# Patient Record
Sex: FEMALE | NOT HISPANIC OR LATINO | URBAN - METROPOLITAN AREA
[De-identification: names, ages, dates, MRNs, and addresses within clinical notes are randomized per-mention and may not be internally consistent; named-entity substitution may affect disease eponyms.]

---

## 2018-08-31 ENCOUNTER — IMPORTED ENCOUNTER (OUTPATIENT)
Dept: URBAN - METROPOLITAN AREA CLINIC 1 | Facility: CLINIC | Age: 68
End: 2018-08-31

## 2018-08-31 PROBLEM — H40.1122: Noted: 2018-08-31

## 2018-08-31 PROBLEM — Z79.84: Noted: 2018-08-31

## 2018-08-31 PROBLEM — E11.9: Noted: 2018-08-31

## 2018-08-31 PROBLEM — H40.1114: Noted: 2018-08-31

## 2018-08-31 PROBLEM — Z96.1: Noted: 2018-08-31

## 2018-08-31 PROCEDURE — 92133 CPTRZD OPH DX IMG PST SGM ON: CPT

## 2018-08-31 PROCEDURE — 92004 COMPRE OPH EXAM NEW PT 1/>: CPT

## 2018-08-31 NOTE — PATIENT DISCUSSION
1. COAG OU (End Stage OD Mild OS) (CD 0.95/0.75) H/o SLT OD. H/o Tube Shunt OD Marcelene Bark) *H/o Multiple Medication Allergys- Lumigan Zioptan Brimonidine Azopt Rhopressa*  OCT shows advanced thinning OD; OCT appears better than clinical picture OS Mild thinning on OCT OS. Begin Methazolamide 50mg po Qdaily. Cont Pilocarpine 1% BID OU. Will recheck patient in 3-4 mo for IOP check and VF 24-2 testing. 2.  DM Type II (Oral Meds) without sign of diabetic retinopathy and no blot heme on dilated retinal examination today OU No Macular Edema:  Discussed the pathophysiology of diabetes and its effect on the eye and risk of blindness. Stressed the importance of strong glucose control. Advised of importance of at least yearly dilated examinations but to contact us immediately for any problems or concerns. 3. Pseudophakia OU- Toric OD Standard OS. (Done by Scoper OU) Return for an appointment in 3-4 mo 10 Vf 24-2 OU with Dr. Arun Demarco.

## 2018-08-31 NOTE — PATIENT DISCUSSION
DM Type II (Oral Meds) without sign of diabetic retinopathy and no blot heme on dilated retinal examination today OU No Macular Edema:  Discussed the pathophysiology of diabetes and its effect on the eye and risk of blindness. Stressed the importance of strong glucose control. Advised of importance of at least yearly dilated examinations but to contact us immediately for any problems or concerns.

## 2018-12-04 ENCOUNTER — IMPORTED ENCOUNTER (OUTPATIENT)
Dept: URBAN - METROPOLITAN AREA CLINIC 1 | Facility: CLINIC | Age: 68
End: 2018-12-04

## 2018-12-04 PROBLEM — H40.1121: Noted: 2018-12-04

## 2018-12-04 PROBLEM — H40.1114: Noted: 2018-12-04

## 2018-12-04 PROCEDURE — 92083 EXTENDED VISUAL FIELD XM: CPT

## 2018-12-04 PROCEDURE — 92012 INTRM OPH EXAM EST PATIENT: CPT

## 2018-12-04 NOTE — PATIENT DISCUSSION
1. COAG OU (End Stage OD Mild OS) (CD 0.95/0.75) H/o SLT OD. H/o Tube Shunt OD Deena Flowers) *H/o Multiple Medication Allergys- Lumigan Zioptan Brimonidine Azopt Rhopressa*  VF normal OD nonspecific defect OS but quality poor. IOP improved OU on current meds. Continue Methazolamide 50mg po Qdaily. Cont Pilocarpine 1% BID OU.  2.  H/o DM Type II (Oral Meds) w/o  OU 3. Pseudophakia OU- Toric OD Standard OS. (Done by Sandhya Lyons for an appointment in 4 mo 10 with Dr. Katerin Marshall.

## 2019-04-04 ENCOUNTER — IMPORTED ENCOUNTER (OUTPATIENT)
Dept: URBAN - METROPOLITAN AREA CLINIC 1 | Facility: CLINIC | Age: 69
End: 2019-04-04

## 2019-04-04 PROBLEM — H40.1121: Noted: 2019-04-04

## 2019-04-04 PROBLEM — H40.1114: Noted: 2019-04-04

## 2019-04-04 PROCEDURE — 92012 INTRM OPH EXAM EST PATIENT: CPT

## 2019-04-04 NOTE — PATIENT DISCUSSION
1. COAG OU (End Stage OD Mild OS) (CD 0.95/0.75) H/o SLT OD. H/o Tube Shunt OD Katie Monaco) *H/o Multiple Medication Allergys- Lumigan Zioptan Brimonidine Azopt Rhopressa*  IOP essentially stable OU on current meds. Continue Methazolamide 50mg po Qdaily. Cont Pilocarpine 1% BID OU.  2.  H/o DM Type II (Oral Meds) w/o DR OU 3. Pseudophakia OU- Toric OD Standard OS. (Done by Isabel Brooke. Allergic Conjunctivitis OU- Use Zaditor BID OU PRN for allergies. Return for an appointment in 4 months 27 with Dr. Fela Francis.

## 2019-08-19 ENCOUNTER — IMPORTED ENCOUNTER (OUTPATIENT)
Dept: URBAN - METROPOLITAN AREA CLINIC 1 | Facility: CLINIC | Age: 69
End: 2019-08-19

## 2019-08-19 PROBLEM — E11.9: Noted: 2019-08-19

## 2019-08-19 PROBLEM — Z79.84: Noted: 2019-08-19

## 2019-08-19 PROBLEM — H40.1114: Noted: 2019-08-19

## 2019-08-19 PROBLEM — H40.1121: Noted: 2019-08-19

## 2019-08-19 PROCEDURE — 92014 COMPRE OPH EXAM EST PT 1/>: CPT

## 2019-08-19 PROCEDURE — 92133 CPTRZD OPH DX IMG PST SGM ON: CPT

## 2019-08-19 NOTE — PATIENT DISCUSSION
Indeterminate Open Angle Glaucoma OD -Patient to continue with current gtt regimen. Patient advised to be compliant with gtts. Condition was discussed with patient and patient understands. Will continue to monitor patient for any progression in condition. Patient was advised to call us with any problems questions or concerns.

## 2019-08-19 NOTE — PATIENT DISCUSSION
1.  DM Type II (Oral Meds) without sign of diabetic retinopathy and no blot heme on dilated retinal examination today OU No Macular Edema:  Discussed the pathophysiology of diabetes and its effect on the eye and risk of blindness. Stressed the importance of strong glucose control. Advised of importance of at least yearly dilated examinations but to contact us immediately for any problems or concerns. 2. COAG OU (End Stage OD Mild OS) (CD 0.95/0.75) H/o SLT OD. H/o Tube Shunt OD Lorie Moellers) *H/o Multiple Medication Allergys- Lumigan Zioptan Brimonidine Azopt Rhopressa* OCT shows no progression OU. IOP stable OU on current meds. Continue Methazolamide 50mg po Qdaily. Cont Pilocarpine 1% BID OU. 3.  Pseudophakia OU- Toric OD Standard OS. (Done by Scoper Pearl Javier. Allergic Conjunctivitis OU- Cont Zaditor BID OU PRN for allergies. Letter to PCP MRx deferred (Pt wishes to return for 36 with RBF)Return for an appointment in next available 36 with Dr. Lissette Hanson. Return for an appointment in 4 mo 10 VF 24-2 OU with Dr. Yesika Lao.

## 2019-08-22 NOTE — PATIENT DISCUSSION
-Risks/benefits/alternatives of the procedure were discussed with the patient.
Based on the patient’s desires, recommend patient select the Symfony IOL using laser-assisted surgery and astigmatism treatment after the risks and benefits were discussed, including decreased vision and glare in low light, and may still need glasses for some activities.
Based on the patient’s desires, recommend patient select the Symfony Toric IOL using laser-assisted surgery and astigmatism treatment after the risks and benefits were discussed, including decreased vision and glare in low light, and may still need glasses for some activities.
Based on the patient’s desires, recommend patient select the Toric IOL using laser-assisted surgery and astigmatism treatment after the risks and benefits were discussed. Glasses will still be needed for some activities depending on target and residual astigmatism.
Block.
Glasses Rx given.
Patient understands condition, prognosis and need for follow up care.
cl rx given.
normal...

## 2019-08-29 ENCOUNTER — IMPORTED ENCOUNTER (OUTPATIENT)
Dept: URBAN - METROPOLITAN AREA CLINIC 1 | Facility: CLINIC | Age: 69
End: 2019-08-29

## 2019-08-29 PROBLEM — H52.13: Noted: 2019-08-29

## 2019-08-29 PROBLEM — H52.223: Noted: 2019-08-29

## 2019-08-29 PROBLEM — H52.4: Noted: 2019-08-29

## 2019-08-29 PROCEDURE — S0621 ROUTINE OPHTHALMOLOGICAL EXA: HCPCS

## 2019-08-29 NOTE — PATIENT DISCUSSION
1. Myopia OU -- Finalized Glasses MRx was given to patient today for correction if indicated and requested2. Astigmatism OU 3. Presbyopia OU4. Dry Eyes w/ PEK OU -- Recommend increase the frequent use of OTC PF AT's TID-QID OU Routinely (Sample of Refresh Optive PF Jatinder-3 Given)5. Pseudophakia OU (Done by Scoper OU: OD- Toric / OS- Standard) -- Doing well6. End Stage COAG OD / Mild COAG OS (CD: 0.95 / 0.75) -- IOP 12 / 15 today. IOP stable OU on current meds. Asymmetric Cupping OD > OS. H/o SLT OD. H/o Tube Shunt OD [facility]) *H/o Multiple Medication Allergys- Lumigan Zioptan Brimonidine Azopt Rhopressa*   Continue Methazolamide 50mg po Qdaily. Cont Pilocarpine 1% BID OU.  7.  H/o DM Type II (Oral Meds) w/o DR / DME OU 8. H/o PVD OU Return for an appointment in 1 YR for a 40 OU with Dr. Malou Rojas. Return as scheduled in December 2019 for 10 / 24-2 HVF OU appointment with Dr. Inder Schmid.

## 2019-12-13 ENCOUNTER — IMPORTED ENCOUNTER (OUTPATIENT)
Dept: URBAN - METROPOLITAN AREA CLINIC 1 | Facility: CLINIC | Age: 69
End: 2019-12-13

## 2019-12-13 PROBLEM — H40.1113: Noted: 2019-12-13

## 2019-12-13 PROBLEM — H40.1121: Noted: 2019-12-13

## 2019-12-13 PROCEDURE — 92012 INTRM OPH EXAM EST PATIENT: CPT

## 2019-12-13 PROCEDURE — 92083 EXTENDED VISUAL FIELD XM: CPT

## 2019-12-13 NOTE — PATIENT DISCUSSION
1.  End Stage Open Angle Glaucoma OD/Mild OS (CD 0.95/0.75) - HVF shows superior arcuate OD>OS. IOP stable Continue Methazolamide 50mg po Qdaily. Cont Pilocarpine 1% BID OU. Patient advised to be compliant with gtts. H/o SLT OD. H/o Tube Shunt OD Merrick Schneider) *H/o Multiple Medication Allergys- Lumigan Zioptan Brimonidine Azopt Rhopressa*  2.  Pseudophakia OU- Toric OD Standard OS. (Done by [employer]. Allergic Conjunctivitis OU- Use Zaditor BID OU PRN for allergies. 4.  H/o DM Type II (Oral Meds) w/o DR Cherylene Lefort for an appointment in 4 months 10 with Dr. Vannessa Araiza.

## 2019-12-13 NOTE — PATIENT DISCUSSION
Pseudophakia OU- Toric OD Standard OS. (Done by [employer]. Allergic Conjunctivitis OU- Use Zaditor BID OU PRN for allergies. 4.  H/o DM Type II (Oral Meds) w/o DR Cherylene Lefort for an appointment in 4 months 10 with Dr. Vannessa Araiza.

## 2021-01-22 ENCOUNTER — IMPORTED ENCOUNTER (OUTPATIENT)
Dept: URBAN - METROPOLITAN AREA CLINIC 1 | Facility: CLINIC | Age: 71
End: 2021-01-22

## 2021-01-22 PROBLEM — H04.123: Noted: 2021-01-22

## 2021-01-22 PROBLEM — E11.3293: Noted: 2021-01-22

## 2021-01-22 PROBLEM — Z79.84: Noted: 2021-01-22

## 2021-01-22 PROBLEM — E11.9: Noted: 2021-01-22

## 2021-01-22 PROBLEM — H40.1113: Noted: 2021-01-22

## 2021-01-22 PROBLEM — H16.143: Noted: 2021-01-22

## 2021-01-22 PROBLEM — H40.1121: Noted: 2021-01-22

## 2021-01-22 PROCEDURE — 92014 COMPRE OPH EXAM EST PT 1/>: CPT

## 2021-01-22 PROCEDURE — 92133 CPTRZD OPH DX IMG PST SGM ON: CPT

## 2021-01-22 NOTE — PATIENT DISCUSSION
1.  DM Type II (Oral Meds) without sign of diabetic retinopathy and no blot heme on dilated retinal examination today OU No Macular Edema:  Discussed the pathophysiology of diabetes and its effect on the eye and risk of blindness. Stressed the importance of strong glucose control. Advised of importance of at least yearly dilated examinations but to contact us immediately for any problems or concerns. \2. End Stage Open Angle Glaucoma OD/Mild OS (CD 0.95/0.75) No progression by OCT. IOP stable Continue Methazolamide 50mg po Qdaily (erx'd). Cont Pilocarpine 1% BID OU. Patient advised to be compliant with gtts and appts. H/o SLT OD. H/o Tube Shunt OD Rylee Wasserman) *H/o Multiple Medication Allergys- Lumigan Zioptan Brimonidine Azopt Rhopressa*  3. Pseudophakia OU- Toric OD Standard OS. (Done by Scoper Miriam Howard. Dry Eyes w/ PEK OU -- Cont increase the frequent use of OTC PF AT's TID-QID OU Routinely 5. Allergic Conjunctivitis OU- Use Zaditor BID OU PRN for allergies. 6.  PVD OU -- Stable. RD Precautions. Patient defers MRx today. Letter to PCP. Return for an appointment in 6 months for a 10/HVF 24-2 with Dr. Unique Schmitz.

## 2021-01-22 NOTE — PATIENT DISCUSSION
Dry Eyes--Both Eyes-Recommend patient to continue Artificial Tear use. Prior lower plugs have fallen out. Given prior tolerance to inferior plugs cautery now recommended to lower lid punctum to help retain moisture to the corneal surface. Risks and benefits discussed with patient and patient states fully understanding. Patient would like to proceed with cauterization.

## 2021-07-30 ENCOUNTER — IMPORTED ENCOUNTER (OUTPATIENT)
Dept: URBAN - METROPOLITAN AREA CLINIC 1 | Facility: CLINIC | Age: 71
End: 2021-07-30

## 2021-07-30 PROBLEM — H40.1121: Noted: 2021-07-30

## 2021-07-30 PROBLEM — H40.1113: Noted: 2021-07-30

## 2021-07-30 PROCEDURE — 99213 OFFICE O/P EST LOW 20 MIN: CPT

## 2021-07-30 PROCEDURE — 92083 EXTENDED VISUAL FIELD XM: CPT

## 2021-07-30 NOTE — PATIENT DISCUSSION
Severe Open Angle Glaucoma OD -Patient to continue with current gtt regimen. Patient advised to be compliant with gtts. Condition was discussed with patient and patient understands. Will continue to monitor patient for any progression in condition. Patient was advised to call us with any problems questions or concerns.

## 2021-07-30 NOTE — PATIENT DISCUSSION
1.  End Stage Open Angle Glaucoma OD/Mild OS (CD 0.95/0.75) HVF shows superior and inferior arcuate OD WNL OS. IOP stable Continue Methazolamide 50mg po Qdaily (erx'd). Cont Pilocarpine 1% BID OU (erx'd). Patient advised to be compliant with gtts and appts. H/o SLT OD. H/o Tube Shunt OD Vernida Severin) *H/o Multiple Medication Allergys- Lumigan Zioptan Brimonidine Azopt Rhopressa*  2. Dry Eyes w/ PEK OU -- Cont increase the frequent use of OTC PF AT's TID-QID OU Routinely 3. Pseudophakia OU -- Toric OD Standard OS. (Done by Isabel Prabhakar. DM Type II (Oral Meds) without sign of diabetic retinopathy -- not evaluated today5. Allergic Conjunctivitis OU -- Use Zaditor BID OU PRN for allergies. 6.  PVD OU -- Stable. RD Precautions. Return for an appointment in 6 months 30/OCT with Dr. Sterling Mullan.

## 2021-11-04 ENCOUNTER — IMPORTED ENCOUNTER (OUTPATIENT)
Dept: URBAN - METROPOLITAN AREA CLINIC 1 | Facility: CLINIC | Age: 71
End: 2021-11-04

## 2021-11-04 PROCEDURE — 99213 OFFICE O/P EST LOW 20 MIN: CPT

## 2021-11-04 NOTE — PATIENT DISCUSSION
1.  Blunt trauma without significant ocular involvement. Pt fell and hit side of face. Shunt in place and covered. 2.  End Stage Open Angle Glaucoma OD/Mild OS (CD 0.95/0.75) IOP stable Continue Methazolamide 50mg po Qdaily. Cont Pilocarpine 1% BID OU. Patient advised to be compliant with gtts and appts. H/o SLT OD. H/[facility] [place] [facility] [place] [place] *H/o Multiple Medication Allergys- Lumigan Zioptan Brimonidine Azopt Rhopressa*  3. Dry Eyes w/ PEK OU -- Cont increase the frequent use of OTC PF AT's TID-QID OU Routinely 4. Pseudophakia OU -- Toric OD Standard OS. (Done by Scoper Serge Gann. Allergic Conjunctivitis OU -- Use Zaditor BID OU PRN for allergies. 6.  H/o DM Type II (Oral Meds) without sign of diabetic retinopathy 7. H/o PVD OU Return for an appointment as scheduled with Dr. Sarah Molina.

## 2021-11-06 PROBLEM — S05.92XA: Noted: 2021-11-06

## 2022-01-28 ENCOUNTER — IMPORTED ENCOUNTER (OUTPATIENT)
Dept: URBAN - METROPOLITAN AREA CLINIC 1 | Facility: CLINIC | Age: 72
End: 2022-01-28

## 2022-01-28 PROCEDURE — 92133 CPTRZD OPH DX IMG PST SGM ON: CPT

## 2022-01-28 PROCEDURE — 99214 OFFICE O/P EST MOD 30 MIN: CPT

## 2022-01-28 NOTE — PATIENT DISCUSSION
1.  DM Type II (Oral Meds) without sign of diabetic retinopathy and no blot heme on dilated retinal examination today OU No Macular Edema:  Discussed the pathophysiology of diabetes and its effect on the eye and risk of blindness. Stressed the importance of strong glucose control. Advised of importance of at least yearly dilated examinations but to contact us immediately for any problems or concerns. 2. Severe Open Angle Glaucoma OD/Mild OS -- (CD 0.95/0.75) No significant change by OCT. IOP stable OU. Continue Methazolamide 37SK PO QD and Policarpine 1% BID OU. H/o SLT OD. H/o Tube Shunt OD Waldo Conception) *H/o Multiple Medication Allergys- Lumigan Zioptan Brimonidine Azopt Rhopressa* Patient to continue with current gtt regimen. Patient advised to be compliant with gtts. Condition was discussed with patient and patient understands. Will continue to monitor patient for any progression in condition. Patient was advised to call us with any problems questions or concerns. 3.  MERRY w/ PEK OU -- Continue PF ATs TID-QID OU routinely. 4.  Pseudophakia OU (Toric OD/Standard OS; Dr. Ryan Walter) -- Doing well. 5.  PVD OU -- Stable. RD precautions. Patient deferred MRx at today's visit. Letter to PCP. Return for an appointment in 4 months 10/IOP Check with Dr. Deja Rolle.

## 2022-01-29 PROBLEM — H04.123: Noted: 2022-01-29

## 2022-01-29 PROBLEM — H43.813: Noted: 2022-01-29

## 2022-01-29 PROBLEM — H16.143: Noted: 2022-01-29

## 2022-01-29 PROBLEM — H40.1121: Noted: 2022-01-29

## 2022-01-29 PROBLEM — H40.1113: Noted: 2022-01-29

## 2022-01-29 PROBLEM — E11.9: Noted: 2022-01-29

## 2022-01-29 PROBLEM — Z79.84: Noted: 2022-01-29

## 2022-04-02 ASSESSMENT — VISUAL ACUITY
OD_SC: 20/30-1
OS_SC: 20/20
OS_SC: 20/20
OD_SC: 20/40
OS_SC: 20/20
OD_SC: J1
OS_SC: J1
OD_SC: 20/20
OS_CC: J7
OD_SC: 20/20-2
OD_SC: 20/40
OS_SC: 20/20
OD_CC: J7
OD_SC: 20/20-1
OS_SC: 20/20
OS_SC: 20/20
OD_SC: 20/20-2
OS_SC: 20/20
OS_SC: 20/20
OD_SC: 20/25+1
OS_SC: 20/20
OD_SC: 20/20
OD_SC: 20/20
OS_SC: 20/20

## 2022-04-02 ASSESSMENT — TONOMETRY
OD_IOP_MMHG: 14
OS_IOP_MMHG: 15
OD_IOP_MMHG: 12
OS_IOP_MMHG: 18
OD_IOP_MMHG: 13
OS_IOP_MMHG: 14
OS_IOP_MMHG: 16
OS_IOP_MMHG: 14
OS_IOP_MMHG: 15
OS_IOP_MMHG: 16
OS_IOP_MMHG: 15
OD_IOP_MMHG: 13
OD_IOP_MMHG: 11
OD_IOP_MMHG: 16
OD_IOP_MMHG: 12
OD_IOP_MMHG: 12
OD_IOP_MMHG: 14
OS_IOP_MMHG: 15

## 2022-07-28 ENCOUNTER — FOLLOW UP (OUTPATIENT)
Dept: URBAN - METROPOLITAN AREA CLINIC 1 | Facility: CLINIC | Age: 72
End: 2022-07-28

## 2022-07-28 DIAGNOSIS — H04.123: ICD-10-CM

## 2022-07-28 DIAGNOSIS — H40.1113: ICD-10-CM

## 2022-07-28 DIAGNOSIS — H40.1121: ICD-10-CM

## 2022-07-28 PROCEDURE — 99213 OFFICE O/P EST LOW 20 MIN: CPT

## 2022-07-28 ASSESSMENT — VISUAL ACUITY
OS_CC: J1
OS_CC: 20/20-1
OD_CC: J1
OD_CC: 20/25

## 2022-07-28 ASSESSMENT — TONOMETRY
OS_IOP_MMHG: 13
OD_IOP_MMHG: 13

## 2022-07-28 NOTE — PATIENT DISCUSSION
(CD 0.75 OS) IOP stable OU. Continue Methazolamide 41BZ PO QD and Policarpine 1% BID OU. H/o SLT OD. H/o Tube Shunt OD Bethanne Crate) *H/o Multiple Medication Allergys- Lumigan Zioptan Brimonidine Azopt Rhopressa* Patient to continue with current gtt regimen. Patient advised to be compliant with gtts. Condition was discussed with patient and patient understands. Will continue to monitor patient for any progression in condition. Patient was advised to call us with any problems questions or concerns.

## 2022-07-28 NOTE — PATIENT DISCUSSION
(CD 0.95 OD) IOP stable OU. Continue Methazolamide 83GK PO QD and Policarpine 1% BID OU. H/o SLT OD. H/o Tube Shunt OD Waldo Conception) *H/o Multiple Medication Allergys- Lumigan Zioptan Brimonidine Azopt Rhopressa* Patient to continue with current gtt regimen. Patient advised to be compliant with gtts. Condition was discussed with patient and patient understands. Will continue to monitor patient for any progression in condition. Patient was advised to call us with any problems questions or concerns.

## 2022-12-01 ENCOUNTER — COMPREHENSIVE EXAM (OUTPATIENT)
Dept: URBAN - METROPOLITAN AREA CLINIC 1 | Facility: CLINIC | Age: 72
End: 2022-12-01

## 2022-12-01 PROCEDURE — 99214 OFFICE O/P EST MOD 30 MIN: CPT

## 2022-12-01 PROCEDURE — 92133 CPTRZD OPH DX IMG PST SGM ON: CPT

## 2022-12-01 ASSESSMENT — VISUAL ACUITY
OD_CC: 20/20
OS_CC: 20/20
OS_CC: J1
OD_CC: J1

## 2022-12-01 ASSESSMENT — TONOMETRY
OD_IOP_MMHG: 11
OS_IOP_MMHG: 12

## 2022-12-01 NOTE — PATIENT DISCUSSION
(CD 0.95 OD) IOP stable OU. Continue Methazolamide 73PV PO QD and Policarpine 1% BID OU. H/o SLT OD. H/o Tube Shunt OD [place]) *H/o Multiple Medication Allergys- Lumigan Zioptan Brimonidine Azopt Rhopressa* Patient to continue with current gtt regimen. Patient advised to be compliant with gtts. Condition was discussed with patient and patient understands. Will continue to monitor patient for any progression in condition. Patient was advised to call us with any problems questions or concerns.

## 2022-12-01 NOTE — PATIENT DISCUSSION
(CD 0.75 OS) IOP stable OU. Continue Methazolamide 67WZ PO QD and Policarpine 1% BID OU. H/o SLT OD. H/o Tube Shunt OD [place]) *H/o Multiple Medication Allergys- Lumigan Zioptan Brimonidine Azopt Rhopressa* Patient to continue with current gtt regimen. Patient advised to be compliant with gtts. Condition was discussed with patient and patient understands. Will continue to monitor patient for any progression in condition. Patient was advised to call us with any problems questions or concerns.

## 2023-04-03 ENCOUNTER — EMERGENCY VISIT (OUTPATIENT)
Dept: URBAN - METROPOLITAN AREA CLINIC 1 | Facility: CLINIC | Age: 73
End: 2023-04-03

## 2023-04-03 DIAGNOSIS — H00.14: ICD-10-CM

## 2023-04-03 PROCEDURE — 99213 OFFICE O/P EST LOW 20 MIN: CPT

## 2023-04-03 RX ORDER — CEPHALEXIN 500 MG/1: 1 CAPSULE ORAL TWICE A DAY

## 2023-04-03 ASSESSMENT — VISUAL ACUITY
OS_CC: J1
OD_CC: J1
OS_CC: 20/20
OD_CC: 20/20-2

## 2023-04-03 ASSESSMENT — TONOMETRY
OD_IOP_MMHG: 13
OS_IOP_MMHG: 26

## 2023-04-11 ENCOUNTER — FOLLOW UP (OUTPATIENT)
Dept: URBAN - METROPOLITAN AREA CLINIC 1 | Facility: CLINIC | Age: 73
End: 2023-04-11

## 2023-04-11 DIAGNOSIS — H40.1121: ICD-10-CM

## 2023-04-11 DIAGNOSIS — H40.1113: ICD-10-CM

## 2023-04-11 PROCEDURE — 99213 OFFICE O/P EST LOW 20 MIN: CPT

## 2023-04-11 ASSESSMENT — VISUAL ACUITY
OS_CC: 20/20
OD_CC: 20/20-2

## 2023-04-11 ASSESSMENT — TONOMETRY
OS_IOP_MMHG: 20
OD_IOP_MMHG: 12

## 2023-04-21 ENCOUNTER — FOLLOW UP (OUTPATIENT)
Dept: URBAN - METROPOLITAN AREA CLINIC 1 | Facility: CLINIC | Age: 73
End: 2023-04-21

## 2023-04-21 DIAGNOSIS — H40.1121: ICD-10-CM

## 2023-04-21 DIAGNOSIS — H40.1113: ICD-10-CM

## 2023-04-21 PROCEDURE — 99213 OFFICE O/P EST LOW 20 MIN: CPT

## 2023-04-21 ASSESSMENT — TONOMETRY
OS_IOP_MMHG: 22
OD_IOP_MMHG: 14

## 2023-04-21 ASSESSMENT — VISUAL ACUITY
OD_CC: 20/25
OS_CC: 20/25

## 2023-06-09 ENCOUNTER — FOLLOW UP (OUTPATIENT)
Dept: URBAN - METROPOLITAN AREA CLINIC 1 | Facility: CLINIC | Age: 73
End: 2023-06-09

## 2023-06-09 DIAGNOSIS — H40.1121: ICD-10-CM

## 2023-06-09 DIAGNOSIS — H40.1113: ICD-10-CM

## 2023-06-09 PROCEDURE — 99213 OFFICE O/P EST LOW 20 MIN: CPT

## 2023-06-09 PROCEDURE — 92083 EXTENDED VISUAL FIELD XM: CPT

## 2023-06-09 ASSESSMENT — TONOMETRY
OD_IOP_MMHG: 15
OS_IOP_MMHG: 23

## 2023-06-09 ASSESSMENT — VISUAL ACUITY
OD_CC: 20/25
OS_CC: 20/20

## 2023-10-09 ENCOUNTER — FOLLOW UP (OUTPATIENT)
Dept: URBAN - METROPOLITAN AREA CLINIC 1 | Facility: CLINIC | Age: 73
End: 2023-10-09

## 2023-10-09 DIAGNOSIS — H40.1121: ICD-10-CM

## 2023-10-09 DIAGNOSIS — H40.1113: ICD-10-CM

## 2023-10-09 PROCEDURE — 99213 OFFICE O/P EST LOW 20 MIN: CPT

## 2023-10-09 ASSESSMENT — TONOMETRY
OS_IOP_MMHG: 21
OD_IOP_MMHG: 14

## 2023-10-09 ASSESSMENT — VISUAL ACUITY
OD_CC: 20/25-2
OS_CC: 20/20-2

## 2024-02-05 ENCOUNTER — COMPREHENSIVE EXAM (OUTPATIENT)
Dept: URBAN - METROPOLITAN AREA CLINIC 1 | Facility: CLINIC | Age: 74
End: 2024-02-05

## 2024-02-05 DIAGNOSIS — H40.1113: ICD-10-CM

## 2024-02-05 DIAGNOSIS — E11.9: ICD-10-CM

## 2024-02-05 DIAGNOSIS — H40.1121: ICD-10-CM

## 2024-02-05 PROCEDURE — 92133 CPTRZD OPH DX IMG PST SGM ON: CPT

## 2024-02-05 PROCEDURE — 99214 OFFICE O/P EST MOD 30 MIN: CPT

## 2024-02-05 ASSESSMENT — TONOMETRY
OD_IOP_MMHG: 14
OS_IOP_MMHG: 14

## 2024-02-05 ASSESSMENT — VISUAL ACUITY
OD_CC: 20/25
OS_CC: 20/20

## 2024-08-08 ENCOUNTER — FOLLOW UP (OUTPATIENT)
Dept: URBAN - METROPOLITAN AREA CLINIC 1 | Facility: CLINIC | Age: 74
End: 2024-08-08

## 2024-08-08 DIAGNOSIS — H40.1113: ICD-10-CM

## 2024-08-08 DIAGNOSIS — H40.1121: ICD-10-CM

## 2024-08-08 PROCEDURE — 99213 OFFICE O/P EST LOW 20 MIN: CPT

## 2024-08-08 PROCEDURE — 92083 EXTENDED VISUAL FIELD XM: CPT

## 2024-08-08 ASSESSMENT — VISUAL ACUITY
OS_CC: 20/20-1
OU_CC: 20/20-1
OD_CC: 20/20

## 2024-08-08 ASSESSMENT — TONOMETRY
OD_IOP_MMHG: 14
OS_IOP_MMHG: 14

## 2025-02-28 ENCOUNTER — COMPREHENSIVE EXAM (OUTPATIENT)
Age: 75
End: 2025-02-28

## 2025-02-28 DIAGNOSIS — E11.9: ICD-10-CM

## 2025-02-28 DIAGNOSIS — H40.1113: ICD-10-CM

## 2025-02-28 DIAGNOSIS — H40.1121: ICD-10-CM

## 2025-02-28 PROCEDURE — 99214 OFFICE O/P EST MOD 30 MIN: CPT

## 2025-02-28 PROCEDURE — 92133 CPTRZD OPH DX IMG PST SGM ON: CPT

## 2025-03-21 ENCOUNTER — FOLLOW UP (OUTPATIENT)
Age: 75
End: 2025-03-21

## 2025-03-21 DIAGNOSIS — H40.1113: ICD-10-CM

## 2025-03-21 DIAGNOSIS — H40.1121: ICD-10-CM

## 2025-03-21 PROCEDURE — 99213 OFFICE O/P EST LOW 20 MIN: CPT

## 2025-08-08 ENCOUNTER — FOLLOW UP (OUTPATIENT)
Age: 75
End: 2025-08-08

## 2025-08-08 DIAGNOSIS — H40.1121: ICD-10-CM

## 2025-08-08 DIAGNOSIS — H40.1113: ICD-10-CM

## 2025-08-08 DIAGNOSIS — Z96.1: ICD-10-CM

## 2025-08-08 PROCEDURE — 99213 OFFICE O/P EST LOW 20 MIN: CPT

## 2025-08-08 PROCEDURE — 92083 EXTENDED VISUAL FIELD XM: CPT

## 2025-08-08 PROCEDURE — 92015 DETERMINE REFRACTIVE STATE: CPT

## 2025-08-13 ENCOUNTER — SURGERY/PROCEDURE (OUTPATIENT)
Age: 75
End: 2025-08-13

## 2025-08-13 DIAGNOSIS — H40.1123: ICD-10-CM

## 2025-08-13 PROCEDURE — 0449T INSJ AQUEOUS DRAIN DEV 1ST: CPT

## 2025-08-14 ENCOUNTER — POST-OP (OUTPATIENT)
Age: 75
End: 2025-08-14

## 2025-08-14 DIAGNOSIS — Z98.890: ICD-10-CM

## 2025-08-14 DIAGNOSIS — H40.1121: ICD-10-CM

## 2025-08-14 PROCEDURE — 66999PO NON CO-MANAGED OTHER SURGERY PO

## 2025-08-21 ENCOUNTER — POST-OP (OUTPATIENT)
Age: 75
End: 2025-08-21

## 2025-08-21 DIAGNOSIS — Z98.890: ICD-10-CM

## 2025-08-21 PROCEDURE — 66999PO NON CO-MANAGED OTHER SURGERY PO

## 2025-08-28 ENCOUNTER — POST-OP (OUTPATIENT)
Age: 75
End: 2025-08-28

## 2025-08-28 DIAGNOSIS — Z98.890: ICD-10-CM

## 2025-08-28 PROCEDURE — 66999PO NON CO-MANAGED OTHER SURGERY PO
